# Patient Record
Sex: MALE | Race: WHITE | NOT HISPANIC OR LATINO | Employment: OTHER | URBAN - METROPOLITAN AREA
[De-identification: names, ages, dates, MRNs, and addresses within clinical notes are randomized per-mention and may not be internally consistent; named-entity substitution may affect disease eponyms.]

---

## 2019-11-01 ENCOUNTER — APPOINTMENT (OUTPATIENT)
Dept: CT IMAGING | Facility: HOSPITAL | Age: 84
End: 2019-11-01

## 2019-11-01 ENCOUNTER — OFFICE VISIT (OUTPATIENT)
Dept: NEUROSURGERY | Facility: CLINIC | Age: 84
End: 2019-11-01

## 2019-11-01 ENCOUNTER — HOSPITAL ENCOUNTER (OUTPATIENT)
Dept: CT IMAGING | Facility: HOSPITAL | Age: 84
Discharge: HOME | End: 2019-11-01
Admitting: NEUROLOGICAL SURGERY

## 2019-11-01 VITALS — HEIGHT: 63 IN | TEMPERATURE: 97.7 F | WEIGHT: 137 LBS | BODY MASS INDEX: 24.27 KG/M2

## 2019-11-01 DIAGNOSIS — M43.12 SPONDYLOLISTHESIS OF CERVICAL REGION: ICD-10-CM

## 2019-11-01 DIAGNOSIS — Z98.1 HISTORY OF FUSION OF CERVICAL SPINE: ICD-10-CM

## 2019-11-01 DIAGNOSIS — G99.2 STENOSIS OF CERVICAL SPINE WITH MYELOPATHY (HCC): Primary | ICD-10-CM

## 2019-11-01 DIAGNOSIS — I65.03: ICD-10-CM

## 2019-11-01 DIAGNOSIS — G99.2 STENOSIS OF CERVICAL SPINE WITH MYELOPATHY (HCC): ICD-10-CM

## 2019-11-01 DIAGNOSIS — M48.02 STENOSIS OF CERVICAL SPINE WITH MYELOPATHY (HCC): ICD-10-CM

## 2019-11-01 DIAGNOSIS — I65.03: Primary | ICD-10-CM

## 2019-11-01 DIAGNOSIS — M48.02 STENOSIS OF CERVICAL SPINE WITH MYELOPATHY (HCC): Primary | ICD-10-CM

## 2019-11-01 DIAGNOSIS — R29.898 BILATERAL ARM WEAKNESS: ICD-10-CM

## 2019-11-01 PROCEDURE — 70498 CT ANGIOGRAPHY NECK: CPT

## 2019-11-01 PROCEDURE — 82565 ASSAY OF CREATININE: CPT

## 2019-11-01 PROCEDURE — 99203 OFFICE O/P NEW LOW 30 MIN: CPT | Performed by: NEUROLOGICAL SURGERY

## 2019-11-01 PROCEDURE — 0 IOPAMIDOL PER 1 ML: Performed by: NEUROLOGICAL SURGERY

## 2019-11-01 PROCEDURE — 72125 CT NECK SPINE W/O DYE: CPT

## 2019-11-01 PROCEDURE — 70496 CT ANGIOGRAPHY HEAD: CPT

## 2019-11-01 RX ORDER — NIFEDIPINE 30 MG/1
TABLET, FILM COATED, EXTENDED RELEASE ORAL
COMMUNITY
Start: 2019-10-08

## 2019-11-01 RX ORDER — FINASTERIDE 5 MG/1
TABLET, FILM COATED ORAL
COMMUNITY
Start: 2019-08-28

## 2019-11-01 RX ORDER — PANTOPRAZOLE SODIUM 40 MG/1
TABLET, DELAYED RELEASE ORAL
COMMUNITY
Start: 2019-10-08

## 2019-11-01 RX ORDER — TRAMADOL HYDROCHLORIDE 50 MG/1
TABLET ORAL
COMMUNITY
Start: 2019-10-25

## 2019-11-01 RX ORDER — PRAVASTATIN SODIUM 20 MG
TABLET ORAL
COMMUNITY
Start: 2019-08-28

## 2019-11-01 RX ORDER — DOXAZOSIN 8 MG/1
TABLET ORAL
COMMUNITY
Start: 2019-10-22

## 2019-11-01 RX ORDER — CYCLOBENZAPRINE HCL 10 MG
TABLET ORAL
COMMUNITY
Start: 2019-10-22

## 2019-11-01 RX ADMIN — IOPAMIDOL 75 ML: 755 INJECTION, SOLUTION INTRAVENOUS at 16:40

## 2019-11-01 NOTE — PROGRESS NOTES
Patient: Cheryl Carbajal  : 1935    Primary Care Provider: Dameon Ramirez MD    Requesting Provider: As above        History    Chief Complaint: Weakness in the hands and gait difficulty.    History of Present Illness: Mr. Carbajal is an 83-year-old retired gentleman who is well-known to my service.  On 2011 he underwent ACDF with allografting and plating from C3-6 to address his cord compression and myelopathy.  He generally did well.  Over the last couple of months he has developed increasing weakness in his hands.  He has some tingling but no numbness.  For 1 month he has been using a rolling walker.  He was using a cane for couple of years prior to that.  His balance has been poor for the last 4-5 years.  He has no neck pain or radicular arm symptoms.  For about the last year he has had a left foot drop.  He reports no bowel or bladder dysfunction.    Review of Systems   Constitutional: Negative for activity change, appetite change, chills, diaphoresis, fatigue, fever and unexpected weight change.   HENT: Negative for congestion, dental problem, drooling, ear discharge, ear pain, facial swelling, hearing loss, mouth sores, nosebleeds, postnasal drip, rhinorrhea, sinus pressure, sneezing, sore throat, tinnitus, trouble swallowing and voice change.    Eyes: Negative for photophobia, pain, discharge, redness, itching and visual disturbance.   Respiratory: Negative for apnea, cough, choking, chest tightness, shortness of breath, wheezing and stridor.    Cardiovascular: Negative for chest pain, palpitations and leg swelling.   Gastrointestinal: Negative for abdominal distention, abdominal pain, anal bleeding, blood in stool, constipation, diarrhea, nausea, rectal pain and vomiting.   Endocrine: Negative for cold intolerance, heat intolerance, polydipsia, polyphagia and polyuria.   Genitourinary: Negative for decreased urine volume, difficulty urinating, dysuria, enuresis, flank pain, frequency, genital  "sores, hematuria and urgency.   Musculoskeletal: Positive for arthralgias, back pain, myalgias, neck pain and neck stiffness. Negative for gait problem and joint swelling.   Skin: Negative for color change, pallor, rash and wound.   Allergic/Immunologic: Negative for environmental allergies, food allergies and immunocompromised state.   Neurological: Negative for dizziness, tremors, seizures, syncope, facial asymmetry, speech difficulty, weakness, light-headedness, numbness and headaches.   Hematological: Negative for adenopathy. Does not bruise/bleed easily.   Psychiatric/Behavioral: Negative for agitation, behavioral problems, confusion, decreased concentration, dysphoric mood, hallucinations, self-injury, sleep disturbance and suicidal ideas. The patient is not nervous/anxious and is not hyperactive.        The patient's past medical history, past surgical history, family history, and social history have been reviewed at length in the electronic medical record.    Physical Exam:   Temp 97.7 °F (36.5 °C)   Ht 160 cm (63\")   Wt 62.1 kg (137 lb)   BMI 24.27 kg/m²   CONSTITUTIONAL: Patient is well-nourished, pleasant and appears stated age.  CV: Heart regular rate and rhythm without murmur, rub, or gallop.  PULMONARY: Lungs are clear to ascultation.  MUSCULOSKELETAL:  Neck tenderness to palpation is not observed.   ROM in neck is normal.  NEUROLOGICAL:  Orientation, memory, attention span, language function, and cognition have been examined and are intact.  Strength is intact in the upper and lower extremities to direct testing except his  which is diminished bilaterally more so on the left.  Atrophic changes are noted in the intrinsic muscles of each hand.  Muscle tone is normal throughout.  Station and gait are normal.  Sensation is intact to light touch testing throughout.  Deep tendon reflexes are 1+ and symmetrical.  Simone's Sign is negative bilaterally. No clonus is elicited.  Coordination is " intact.      Medical Decision Making    Data Review:   MRI of the cervical spine demonstrates changes related to his prior anterior surgery.  The cord is a little bit atrophic in that region.  There is what I believe to be a large pannus at the C1-2 level that indents the cord anteriorly.  There is some posterior compression from bony and ligamentous overgrowth as well.  There may be some subtle signal change there as well.  There is also a low-grade listhesis of C7 on T1 with significant narrowing and cord normal change    Diagnosis:   The patient harbors a progressive myelopathy that could emanate from either of the above-noted levels.    Treatment Options:   I going to check a CT scan from the occiput to T2 to better visualize his anatomy.  I am also going to check electrodiagnostic studies of his upper extremities.  He will follow-up thereafter.       Diagnosis Plan   1. 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 History of fusion of cervical spine     3. Spondylolisthesis of cervical region     4. Bilateral arm weakness  EMG & Nerve Conduction Test           I, Dr. Carroll, personally performed the services described in the documentation, as scribed in my presence, and it is both accurate and complete.

## 2019-11-04 LAB — CREAT BLDA-MCNC: 0.9 MG/DL (ref 0.6–1.3)

## 2019-11-05 ENCOUNTER — OFFICE VISIT (OUTPATIENT)
Dept: NEUROSURGERY | Facility: CLINIC | Age: 84
End: 2019-11-05

## 2019-11-05 VITALS — TEMPERATURE: 97.3 F | BODY MASS INDEX: 24.45 KG/M2 | WEIGHT: 138 LBS | HEIGHT: 63 IN | RESPIRATION RATE: 16 BRPM

## 2019-11-05 DIAGNOSIS — G56.23 ULNAR NEUROPATHY OF BOTH UPPER EXTREMITIES: ICD-10-CM

## 2019-11-05 DIAGNOSIS — M48.02 STENOSIS OF CERVICAL SPINE WITH MYELOPATHY (HCC): Primary | ICD-10-CM

## 2019-11-05 DIAGNOSIS — G56.02 LEFT CARPAL TUNNEL SYNDROME: ICD-10-CM

## 2019-11-05 DIAGNOSIS — M43.14 SPONDYLOLISTHESIS, THORACIC REGION: Primary | ICD-10-CM

## 2019-11-05 DIAGNOSIS — M43.12 SPONDYLOLISTHESIS OF CERVICAL REGION: ICD-10-CM

## 2019-11-05 DIAGNOSIS — G99.2 STENOSIS OF CERVICAL SPINE WITH MYELOPATHY (HCC): Primary | ICD-10-CM

## 2019-11-05 PROCEDURE — 99213 OFFICE O/P EST LOW 20 MIN: CPT | Performed by: NEUROLOGICAL SURGERY

## 2019-11-05 NOTE — PROGRESS NOTES
Patient: Cheryl Carbajal  : 1935    Primary Care Provider: Dameon Ramirez MD    Requesting Provider: As above        History    Chief Complaint: Weakness in the hands and gait difficulty.    History of Present Illness: Mr. Carbajal is an 83-year-old retired gentleman who is well-known to my service.  On 2011 he underwent ACDF with allografting and plating from C3-6 to address his cord compression and myelopathy.  He generally did well.  Over the last couple of months he has developed increasing weakness in his hands.  He has some tingling but no numbness.  For 1 month he has been using a rolling walker.  He was using a cane for couple of years prior to that.  His balance has been poor for the last 4-5 years.  He has no neck pain or radicular arm symptoms.  For about the last year he has had a left foot drop.  He reports no bowel or bladder dysfunction.    Review of Systems   Constitutional: Negative for activity change, appetite change, chills, diaphoresis, fatigue, fever and unexpected weight change.   HENT: Negative for congestion, dental problem, drooling, ear discharge, ear pain, facial swelling, hearing loss, mouth sores, nosebleeds, postnasal drip, rhinorrhea, sinus pressure, sneezing, sore throat, tinnitus, trouble swallowing and voice change.    Eyes: Negative for photophobia, pain, discharge, redness, itching and visual disturbance.   Respiratory: Negative for apnea, cough, choking, chest tightness, shortness of breath, wheezing and stridor.    Cardiovascular: Negative for chest pain, palpitations and leg swelling.   Gastrointestinal: Negative for abdominal distention, abdominal pain, anal bleeding, blood in stool, constipation, diarrhea, nausea, rectal pain and vomiting.   Endocrine: Negative for cold intolerance, heat intolerance, polydipsia, polyphagia and polyuria.   Genitourinary: Negative for decreased urine volume, difficulty urinating, dysuria, enuresis, flank pain, frequency, genital  "sores, hematuria and urgency.   Musculoskeletal: Negative for arthralgias, back pain, gait problem, joint swelling, myalgias, neck pain and neck stiffness.   Skin: Negative for color change, pallor, rash and wound.   Allergic/Immunologic: Negative for environmental allergies, food allergies and immunocompromised state.   Neurological: Negative for dizziness, tremors, seizures, syncope, facial asymmetry, speech difficulty, weakness, light-headedness, numbness and headaches.   Hematological: Negative for adenopathy. Does not bruise/bleed easily.   Psychiatric/Behavioral: Negative for agitation, behavioral problems, confusion, decreased concentration, dysphoric mood, hallucinations, self-injury, sleep disturbance and suicidal ideas. The patient is not nervous/anxious and is not hyperactive.    All other systems reviewed and are negative.      The patient's past medical history, past surgical history, family history, and social history have been reviewed at length in the electronic medical record.    Physical Exam:   Temp 97.3 °F (36.3 °C) (Temporal)   Resp 16   Ht 160 cm (63\")   Wt 62.6 kg (138 lb)   BMI 24.45 kg/m²   MUSCULOSKELETAL:  Neck tenderness to palpation is not observed.   ROM in neck is limited in all directions.  NEUROLOGICAL:  Strength is intact in the upper and lower extremities to direct testing.  Muscle tone is normal throughout.  Profound atrophic changes are noted in his hands bilaterally.  Station and gait are unsteady and he requires a rolling walker.  Sensation is intact to light touch testing throughout.  Deep tendon reflexes are 1+ and symmetrical.  Simone's Sign is negative bilaterally.     Medical Decision Making    Data Review:   CT demonstrates changes related to his previous hardware construct that spans C3-6.  There is substantial narrowing of the disc space at C6-7.  Prominent grade 1 listhesis of T1 on T2 is noted.  Bony anatomy appears fairly normal at C1 and C2 although a hint of " the previously noted pannus is identified.    CTA of the brain and neck demonstrates fairly symmetrical bilaterally patent vertebral arteries.    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  There is also mild left carpal tunnel syndrome and severe bilateral ulnar neuropathies at the elbow.    MRI of the cervical spine demonstrates changes related to his prior anterior surgery.  The cord is a little bit atrophic in that region.  There is what I believe to be a large pannus at the C1-2 level that indents the cord anteriorly.  There is some posterior compression from bony and ligamentous overgrowth as well.  There may be some subtle signal change there as well.  There is also a low-grade listhesis of C7 on T1 with significant narrowing and cord normal change    Diagnosis:   1.  Cervical myelopathy with hand dysfunction and gait difficulty.  2.  Bilateral ulnar neuropathy that may contribute to his hand difficulties.    Treatment Options:   The patient is going to require cervical decompression with fusion and stabilization.  I am going to review his films with one of my colleagues to determine the extent of that fusion and stabilization.  I would plan on using BMP as an adjunct to promote fusion.  I discussed the use of this.  The goal of surgery would be to stabilize his myelopathy.  It may or may not improve his current neurologic deficits.  At length I explained the nature of the surgical intervention as well as the potential risks, complications, and limitations to the patient and his wife and they have agreed to proceed.  Given his heart difficulties formal cardiac clearance will be required.  We will need to expedite this intervention given his progressive difficulties.       Diagnosis Plan   1. 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 Spondylolisthesis of cervical region     3. Ulnar neuropathy of both upper extremities      4. Left carpal tunnel syndrome         Scribed for Jose Alberto Carroll MD by Rossi Dias CMA on 11/5/2019 10:18 AM       I, Dr. Carroll, personally performed the services described in the documentation, as scribed in my presence, and it is both accurate and complete.

## 2019-11-09 ENCOUNTER — PREP FOR SURGERY (OUTPATIENT)
Dept: OTHER | Facility: HOSPITAL | Age: 84
End: 2019-11-09

## 2019-11-09 ENCOUNTER — OFFICE VISIT (OUTPATIENT)
Dept: NEUROSURGERY | Facility: CLINIC | Age: 84
End: 2019-11-09

## 2019-11-09 VITALS — TEMPERATURE: 97.7 F | WEIGHT: 138.6 LBS | HEIGHT: 63 IN | BODY MASS INDEX: 24.56 KG/M2

## 2019-11-09 DIAGNOSIS — G56.21 ULNAR NEUROPATHY AT ELBOW OF RIGHT UPPER EXTREMITY: Primary | ICD-10-CM

## 2019-11-09 DIAGNOSIS — G56.21 ULNAR NEUROPATHY AT ELBOW OF RIGHT UPPER EXTREMITY: ICD-10-CM

## 2019-11-09 DIAGNOSIS — M43.12 SPONDYLOLISTHESIS OF CERVICAL REGION: ICD-10-CM

## 2019-11-09 DIAGNOSIS — M48.02 STENOSIS OF CERVICAL SPINE WITH MYELOPATHY (HCC): ICD-10-CM

## 2019-11-09 DIAGNOSIS — G99.2 STENOSIS OF CERVICAL SPINE WITH MYELOPATHY (HCC): ICD-10-CM

## 2019-11-09 DIAGNOSIS — Z98.1 HISTORY OF FUSION OF CERVICAL SPINE: ICD-10-CM

## 2019-11-09 DIAGNOSIS — M43.14 SPONDYLOLISTHESIS, THORACIC REGION: Primary | ICD-10-CM

## 2019-11-09 PROCEDURE — 99213 OFFICE O/P EST LOW 20 MIN: CPT | Performed by: NEUROLOGICAL SURGERY

## 2019-11-09 RX ORDER — CEFAZOLIN SODIUM 2 G/100ML
2 INJECTION, SOLUTION INTRAVENOUS ONCE
Status: CANCELLED | OUTPATIENT
Start: 2019-11-09 | End: 2019-11-09

## 2019-11-09 NOTE — PROGRESS NOTES
Patient: Cheryl Carbajal  : 1935    Primary Care Provider: Dameon Ramirez MD    Requesting Provider: As above        History    Chief Complaint: Weakness in the hands with gait difficulty.    History of Present Illness: Mr. Carbajal is an 83-year-old retired gentleman who is well-known to my service.  On 2011 he underwent ACDF with allografting and plating from C3-6 to address his cord compression and myelopathy.  He generally did well.  Over the last couple of months he has developed increasing weakness in his hands.  He has some tingling but no numbness.  For 1 month he has been using a rolling walker.  He was using a cane for couple of years prior to that.  His balance has been poor for the last 4-5 years.  He has no neck pain or radicular arm symptoms.  For about the last year he has had a left foot drop.  He reports no bowel or bladder dysfunction.    Review of Systems   Constitutional: Negative for activity change, appetite change, chills, diaphoresis, fatigue, fever and unexpected weight change.   HENT: Negative for congestion, dental problem, drooling, ear discharge, ear pain, facial swelling, hearing loss, mouth sores, nosebleeds, postnasal drip, rhinorrhea, sinus pressure, sneezing, sore throat, tinnitus, trouble swallowing and voice change.    Eyes: Negative for photophobia, pain, discharge, redness, itching and visual disturbance.   Respiratory: Negative for apnea, cough, choking, chest tightness, shortness of breath, wheezing and stridor.    Cardiovascular: Negative for chest pain, palpitations and leg swelling.   Gastrointestinal: Negative for abdominal distention, abdominal pain, anal bleeding, blood in stool, constipation, diarrhea, nausea, rectal pain and vomiting.   Musculoskeletal: Negative for arthralgias, back pain, gait problem, joint swelling, myalgias, neck pain and neck stiffness.   Skin: Negative for color change, pallor, rash and wound.   Allergic/Immunologic: Negative for  "environmental allergies, food allergies and immunocompromised state.   Neurological: Negative for dizziness, tremors, seizures, syncope, facial asymmetry, speech difficulty, weakness, light-headedness, numbness and headaches.   Hematological: Negative for adenopathy. Does not bruise/bleed easily.   Psychiatric/Behavioral: Negative for agitation, behavioral problems, confusion, decreased concentration, dysphoric mood, self-injury, sleep disturbance and suicidal ideas. The patient is not nervous/anxious and is not hyperactive.        The patient's past medical history, past surgical history, family history, and social history have been reviewed at length in the electronic medical record.    Physical Exam:   Temp 97.7 °F (36.5 °C)   Ht 160 cm (63\")   Wt 62.9 kg (138 lb 9.6 oz)   BMI 24.55 kg/m²   MUSCULOSKELETAL:  Neck tenderness to palpation is not observed.   ROM in neck is somewhat diminished in all directions.  NEUROLOGICAL:  Strength is diminished in his handgrip bilaterally.  Atrophic changes are noted in the intrinsic muscles of his hands.    Medical Decision Making    Data Review:   Thoracic films demonstrate some very mild kyphosis but no profound deformity.    CT demonstrates changes related to his previous hardware construct that spans C3-6.  There is substantial narrowing of the disc space at C6-7.  Prominent grade 1 listhesis of T1 on T2 is noted.  Bony anatomy appears fairly normal at C1 and C2 although a hint of the previously noted pannus is identified.     CTA of the brain and neck demonstrates fairly symmetrical bilaterally patent vertebral arteries.     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  There is also mild left carpal tunnel syndrome and severe bilateral ulnar neuropathies at the elbow.     MRI of the cervical spine demonstrates changes related to his prior anterior surgery.  The cord is a little bit atrophic in that " "region.  There is what I believe to be a large pannus at the C1-2 level that indents the cord anteriorly.  There is some posterior compression from bony and ligamentous overgrowth as well.  There may be some subtle signal change there as well.  There is also a low-grade listhesis of C7 on T1 with significant narrowing and cord normal change       Diagnosis:   1.  Cervical myelopathy with hand dysfunction and gait difficulty.  2.  Bilateral ulnar neuropathy that may contribute to his hand difficulties.    Treatment Options:   I reviewed Mr. Carbajal's films with some of my colleagues and all are in agreement that there is no simple solution.  There is a concern that if I only intervene from the upper cervical region into the high thoracic region that he may \"fall off\" at the top or bottom of his fusion.  Also the pannus at the C1-2 level could be a result of occipitocervical problems and not simply C1-2 issues.  In short surgical intervention to address his current difficulties with his neck would entail decompression at C1 and C2 as well as at C6-7 and C7-1 with fusion and stabilization from the occiput to approximately T4.  This would obviously create substantial stiffness and rigidity in his neck and spine.  After a lengthy conversation he does not feel that he would like to live like that.  He understands that he may develop progressive myelopathy with profound neurologic compromise.  He is, however, going to discuss this with his family.  He has requested that I perform ulnar nerve decompression in an effort to  improve his hands to some extent.  We will make arrangements for a right ulnar nerve decompression at the elbow after the Thanksgiving holiday as per his request.       Diagnosis Plan   1. Spondylolisthesis, thoracic region     2. 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 Spondylolisthesis of cervical region     4. History of fusion of cervical spine     5. Ulnar neuropathy at elbow of " right upper extremity             I, Dr. Carroll, personally performed the services described in the documentation, as scribed in my presence, and it is both accurate and complete.  Scribed for Jose Alberto Carroll MD by Miles Monique CMA on 11/09/2019 at 11:43 AM

## 2019-11-09 NOTE — H&P
Patient: Cheryl Carbajal  : 1935     Primary Care Provider: Dameon Ramirez MD     Requesting Provider: As above           History     Chief Complaint: Weakness in the hands with gait difficulty.     History of Present Illness: Mr. Carbajal is an 83-year-old retired gentleman who is well-known to my service.  On 2011 he underwent ACDF with allografting and plating from C3-6 to address his cord compression and myelopathy.  He generally did well.  Over the last couple of months he has developed increasing weakness in his hands.  He has some tingling but no numbness.  For 1 month he has been using a rolling walker.  He was using a cane for couple of years prior to that.  His balance has been poor for the last 4-5 years.  He has no neck pain or radicular arm symptoms.  For about the last year he has had a left foot drop.  He reports no bowel or bladder dysfunction.     Review of Systems   Constitutional: Negative for activity change, appetite change, chills, diaphoresis, fatigue, fever and unexpected weight change.   HENT: Negative for congestion, dental problem, drooling, ear discharge, ear pain, facial swelling, hearing loss, mouth sores, nosebleeds, postnasal drip, rhinorrhea, sinus pressure, sneezing, sore throat, tinnitus, trouble swallowing and voice change.    Eyes: Negative for photophobia, pain, discharge, redness, itching and visual disturbance.   Respiratory: Negative for apnea, cough, choking, chest tightness, shortness of breath, wheezing and stridor.    Cardiovascular: Negative for chest pain, palpitations and leg swelling.   Gastrointestinal: Negative for abdominal distention, abdominal pain, anal bleeding, blood in stool, constipation, diarrhea, nausea, rectal pain and vomiting.   Musculoskeletal: Negative for arthralgias, back pain, gait problem, joint swelling, myalgias, neck pain and neck stiffness.   Skin: Negative for color change, pallor, rash and wound.   Allergic/Immunologic:  "Negative for environmental allergies, food allergies and immunocompromised state.   Neurological: Negative for dizziness, tremors, seizures, syncope, facial asymmetry, speech difficulty, weakness, light-headedness, numbness and headaches.   Hematological: Negative for adenopathy. Does not bruise/bleed easily.   Psychiatric/Behavioral: Negative for agitation, behavioral problems, confusion, decreased concentration, dysphoric mood, self-injury, sleep disturbance and suicidal ideas. The patient is not nervous/anxious and is not hyperactive.          The patient's past medical history, past surgical history, family history, and social history have been reviewed at length in the electronic medical record.     Past Medical History:   Diagnosis Date   • Arthritis    • Vitiligo      Past Surgical History:   Procedure Laterality Date   • ANTERIOR CERVICAL DISCECTOMY W/ FUSION  08/29/2011    C3-6 ACDF DR. CHRISTINE SHANNON      No family history on file.  Social History     Socioeconomic History   • Marital status:      Spouse name: Not on file   • Number of children: Not on file   • Years of education: Not on file   • Highest education level: Not on file   Tobacco Use   • Smoking status: Never Smoker   • Smokeless tobacco: Never Used   Substance and Sexual Activity   • Alcohol use: No     Frequency: Never   • Drug use: No   • Sexual activity: Defer     No Known Allergies    Physical Exam:   Temp 97.7 °F (36.5 °C)   Ht 160 cm (63\")   Wt 62.9 kg (138 lb 9.6 oz)   BMI 24.55 kg/m²   MUSCULOSKELETAL:  Neck tenderness to palpation is not observed.   ROM in neck is somewhat diminished in all directions.  NEUROLOGICAL:  Strength is diminished in his handgrip bilaterally.  Atrophic changes are noted in the intrinsic muscles of his hands.      Medical Decision Making     Data Review:   Thoracic films demonstrate some very mild kyphosis but no profound deformity.     CT demonstrates changes related to his previous hardware " "construct that spans C3-6.  There is substantial narrowing of the disc space at C6-7.  Prominent grade 1 listhesis of T1 on T2 is noted.  Bony anatomy appears fairly normal at C1 and C2 although a hint of the previously noted pannus is identified.     CTA of the brain and neck demonstrates fairly symmetrical bilaterally patent vertebral arteries.     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  There is also mild left carpal tunnel syndrome and severe bilateral ulnar neuropathies at the elbow.     MRI of the cervical spine demonstrates changes related to his prior anterior surgery.  The cord is a little bit atrophic in that region.  There is what I believe to be a large pannus at the C1-2 level that indents the cord anteriorly.  There is some posterior compression from bony and ligamentous overgrowth as well.  There may be some subtle signal change there as well.  There is also a low-grade listhesis of C7 on T1 with significant narrowing and cord normal change        Diagnosis:   1.  Cervical myelopathy with hand dysfunction and gait difficulty.  2.  Bilateral ulnar neuropathy that may contribute to his hand difficulties.     Treatment Options:   I reviewed Mr. Carbajal's films with some of my colleagues and all are in agreement that there is no simple solution.  There is a concern that if I only intervene from the upper cervical region into the high thoracic region that he may \"fall off\" at the top or bottom of his fusion.  Also the pannus at the C1-2 level could be a result of occipitocervical problems and not simply C1-2 issues.  In short surgical intervention to address his current difficulties with his neck would entail decompression at C1 and C2 as well as at C6-7 and C7-1 with fusion and stabilization from the occiput to approximately T4.  This would obviously create substantial stiffness and rigidity in his neck and spine.  After a " lengthy conversation he does not feel that he would like to live like that.  He understands that he may develop progressive myelopathy with profound neurologic compromise.  He is, however, going to discuss this with his family.  He has requested that I perform ulnar nerve decompression in an effort to  improve his hands to some extent.  We will make arrangements for a right ulnar nerve decompression at the elbow after the Thanksgiving holiday as per his request.          Diagnosis Plan   1. Spondylolisthesis, thoracic region      2. 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 Spondylolisthesis of cervical region      4. History of fusion of cervical spine      5. Ulnar neuropathy at elbow of right upper extremity